# Patient Record
Sex: MALE | Race: WHITE | NOT HISPANIC OR LATINO | URBAN - METROPOLITAN AREA
[De-identification: names, ages, dates, MRNs, and addresses within clinical notes are randomized per-mention and may not be internally consistent; named-entity substitution may affect disease eponyms.]

---

## 2018-07-24 ENCOUNTER — EMERGENCY (EMERGENCY)
Facility: HOSPITAL | Age: 43
LOS: 0 days | Discharge: HOME | End: 2018-07-24
Attending: EMERGENCY MEDICINE | Admitting: EMERGENCY MEDICINE

## 2018-07-24 VITALS
WEIGHT: 244.93 LBS | DIASTOLIC BLOOD PRESSURE: 84 MMHG | SYSTOLIC BLOOD PRESSURE: 116 MMHG | OXYGEN SATURATION: 98 % | HEART RATE: 108 BPM | TEMPERATURE: 99 F | HEIGHT: 73 IN | RESPIRATION RATE: 16 BRPM

## 2018-07-24 VITALS
SYSTOLIC BLOOD PRESSURE: 118 MMHG | RESPIRATION RATE: 18 BRPM | OXYGEN SATURATION: 100 % | TEMPERATURE: 98 F | DIASTOLIC BLOOD PRESSURE: 70 MMHG | HEART RATE: 90 BPM

## 2018-07-24 DIAGNOSIS — K51.00 ULCERATIVE (CHRONIC) PANCOLITIS WITHOUT COMPLICATIONS: ICD-10-CM

## 2018-07-24 DIAGNOSIS — Z87.39 PERSONAL HISTORY OF OTHER DISEASES OF THE MUSCULOSKELETAL SYSTEM AND CONNECTIVE TISSUE: ICD-10-CM

## 2018-07-24 DIAGNOSIS — Z98.890 OTHER SPECIFIED POSTPROCEDURAL STATES: ICD-10-CM

## 2018-07-24 DIAGNOSIS — Z79.899 OTHER LONG TERM (CURRENT) DRUG THERAPY: ICD-10-CM

## 2018-07-24 DIAGNOSIS — Z79.2 LONG TERM (CURRENT) USE OF ANTIBIOTICS: ICD-10-CM

## 2018-07-24 DIAGNOSIS — Z98.890 OTHER SPECIFIED POSTPROCEDURAL STATES: Chronic | ICD-10-CM

## 2018-07-24 DIAGNOSIS — K92.1 MELENA: ICD-10-CM

## 2018-07-24 LAB
ALBUMIN SERPL ELPH-MCNC: 4 G/DL — SIGNIFICANT CHANGE UP (ref 3.5–5.2)
ALP SERPL-CCNC: 64 U/L — SIGNIFICANT CHANGE UP (ref 30–115)
ALT FLD-CCNC: 14 U/L — SIGNIFICANT CHANGE UP (ref 0–41)
ANION GAP SERPL CALC-SCNC: 13 MMOL/L — SIGNIFICANT CHANGE UP (ref 7–14)
AST SERPL-CCNC: 16 U/L — SIGNIFICANT CHANGE UP (ref 0–41)
BASOPHILS # BLD AUTO: 0.05 K/UL — SIGNIFICANT CHANGE UP (ref 0–0.2)
BASOPHILS NFR BLD AUTO: 0.5 % — SIGNIFICANT CHANGE UP (ref 0–1)
BILIRUB SERPL-MCNC: 0.9 MG/DL — SIGNIFICANT CHANGE UP (ref 0.2–1.2)
BUN SERPL-MCNC: 5 MG/DL — LOW (ref 10–20)
CALCIUM SERPL-MCNC: 9 MG/DL — SIGNIFICANT CHANGE UP (ref 8.5–10.1)
CHLORIDE SERPL-SCNC: 100 MMOL/L — SIGNIFICANT CHANGE UP (ref 98–110)
CO2 SERPL-SCNC: 28 MMOL/L — SIGNIFICANT CHANGE UP (ref 17–32)
CREAT SERPL-MCNC: 1.1 MG/DL — SIGNIFICANT CHANGE UP (ref 0.7–1.5)
EOSINOPHIL # BLD AUTO: 0.17 K/UL — SIGNIFICANT CHANGE UP (ref 0–0.7)
EOSINOPHIL NFR BLD AUTO: 1.6 % — SIGNIFICANT CHANGE UP (ref 0–8)
GLUCOSE SERPL-MCNC: 101 MG/DL — HIGH (ref 70–99)
HCT VFR BLD CALC: 42.9 % — SIGNIFICANT CHANGE UP (ref 42–52)
HGB BLD-MCNC: 14.5 G/DL — SIGNIFICANT CHANGE UP (ref 14–18)
IMM GRANULOCYTES NFR BLD AUTO: 0.7 % — HIGH (ref 0.1–0.3)
LIDOCAIN IGE QN: 255 U/L — HIGH (ref 7–60)
LYMPHOCYTES # BLD AUTO: 1.56 K/UL — SIGNIFICANT CHANGE UP (ref 1.2–3.4)
LYMPHOCYTES # BLD AUTO: 14.7 % — LOW (ref 20.5–51.1)
MAGNESIUM SERPL-MCNC: 2.1 MG/DL — SIGNIFICANT CHANGE UP (ref 1.8–2.4)
MCHC RBC-ENTMCNC: 28.3 PG — SIGNIFICANT CHANGE UP (ref 27–31)
MCHC RBC-ENTMCNC: 33.8 G/DL — SIGNIFICANT CHANGE UP (ref 32–37)
MCV RBC AUTO: 83.6 FL — SIGNIFICANT CHANGE UP (ref 80–94)
MONOCYTES # BLD AUTO: 1.25 K/UL — HIGH (ref 0.1–0.6)
MONOCYTES NFR BLD AUTO: 11.8 % — HIGH (ref 1.7–9.3)
NEUTROPHILS # BLD AUTO: 7.51 K/UL — HIGH (ref 1.4–6.5)
NEUTROPHILS NFR BLD AUTO: 70.7 % — SIGNIFICANT CHANGE UP (ref 42.2–75.2)
PLATELET # BLD AUTO: 207 K/UL — SIGNIFICANT CHANGE UP (ref 130–400)
POTASSIUM SERPL-MCNC: 4 MMOL/L — SIGNIFICANT CHANGE UP (ref 3.5–5)
POTASSIUM SERPL-SCNC: 4 MMOL/L — SIGNIFICANT CHANGE UP (ref 3.5–5)
PROT SERPL-MCNC: 6.1 G/DL — SIGNIFICANT CHANGE UP (ref 6–8)
RBC # BLD: 5.13 M/UL — SIGNIFICANT CHANGE UP (ref 4.7–6.1)
RBC # FLD: 14.6 % — HIGH (ref 11.5–14.5)
SODIUM SERPL-SCNC: 141 MMOL/L — SIGNIFICANT CHANGE UP (ref 135–146)
WBC # BLD: 10.61 K/UL — SIGNIFICANT CHANGE UP (ref 4.8–10.8)
WBC # FLD AUTO: 10.61 K/UL — SIGNIFICANT CHANGE UP (ref 4.8–10.8)

## 2018-07-24 RX ORDER — METRONIDAZOLE 500 MG
1 TABLET ORAL
Qty: 42 | Refills: 0 | OUTPATIENT
Start: 2018-07-24 | End: 2018-08-06

## 2018-07-24 RX ORDER — SODIUM CHLORIDE 9 MG/ML
1000 INJECTION INTRAMUSCULAR; INTRAVENOUS; SUBCUTANEOUS ONCE
Qty: 0 | Refills: 0 | Status: COMPLETED | OUTPATIENT
Start: 2018-07-24 | End: 2018-07-24

## 2018-07-24 RX ORDER — MOXIFLOXACIN HYDROCHLORIDE TABLETS, 400 MG 400 MG/1
1 TABLET, FILM COATED ORAL
Qty: 28 | Refills: 0 | OUTPATIENT
Start: 2018-07-24 | End: 2018-08-06

## 2018-07-24 RX ADMIN — SODIUM CHLORIDE 1000 MILLILITER(S): 9 INJECTION INTRAMUSCULAR; INTRAVENOUS; SUBCUTANEOUS at 12:40

## 2018-07-24 RX ADMIN — SODIUM CHLORIDE 1000 MILLILITER(S): 9 INJECTION INTRAMUSCULAR; INTRAVENOUS; SUBCUTANEOUS at 13:34

## 2018-07-24 NOTE — ED PROVIDER NOTE - PHYSICAL EXAMINATION
CONSTITUTIONAL: Well-developed; well-nourished; in no acute distress.   SKIN: warm, dry  CARD: S1, S2 normal; no murmurs, gallops, or rubs. Regular rate and rhythm.   RESP: No wheezes, rales or rhonchi.  ABD: soft ntnd; no CVA tenderness  : no external anal fissures or thrombosed hemorrhoids visualized; no internal hemorrhoids visualized, brown stool on examination, guiaic positive   EXT: Normal ROM.  No clubbing, cyanosis or edema.   LYMPH: No acute cervical adenopathy.  NEURO: Alert, oriented, grossly unremarkable  PSYCH: Cooperative, appropriate.

## 2018-07-24 NOTE — ED PROVIDER NOTE - NS ED ROS FT
Constitutional: See HPI.  Cardiac: No chest pain, SOB or edema. No chest pain with exertion.  Respiratory: No cough or respiratory distress.   GI: +diarrhea; No nausea, vomiting, or abdominal pain.  : No dysuria, frequency or burning.  MS: No myalgia, muscle weakness, joint pain or back pain.  Neuro: No headache or weakness. No LOC.  Skin: No skin rash.

## 2018-07-24 NOTE — ED PROVIDER NOTE - OBJECTIVE STATEMENT
44 y/o male with no pmhx presents with blood diarrhea for 18 days. Patient states he has multiple episodes of bright red bloody diarrhea every day, associated with abdominal cramping right before the bowel movement, relieved after defecation. Patient presented to ER today due to temporal temperature of 99.8-100F at home. Patient has seen Dr. Leon as outpatient for these symptoms, has been scheduled for colonoscopy and endoscopy on Aug 8th and has sent stool cultures to check for salmonalle, shigella, c.diff, etc. Patient states prior to the start of the diarrhea, he was constipated; denies laxative use, recent antibiotic use, travel/hiking.

## 2018-07-24 NOTE — ED PROVIDER NOTE - PROGRESS NOTE DETAILS
Spoke to Dr. Leon who stated to perform CT abdomen/pelvis with IV contrast, if negative, can discharge home and follow up for scheduled colonoscopy on Aug 8th. Dr. Leon aware of CT scan results; advised to prescribe cipro/flagyl, she will follow up with stool cultures as outpatient and advised patient to see her as outpatient on Monday July 30th. Will advise patient to stick to clear liquid diet until follow up with Dr. Leon

## 2018-07-24 NOTE — ED PROVIDER NOTE - ATTENDING CONTRIBUTION TO CARE
I personally evaluated the patient. I reviewed the Resident’s or Physician Assistant’s note (as assigned above), and agree with the findings and plan except as documented in my note.  Healthy man with watery bloody diarrhea as above for over 2 weeks, +nausea but is taking liquids, +occasional crampy abd pain, T100, no recent travel, has been on doxy for last year for rosacea, seen by Dr Leon, gave stool specimens yesterday and is scheduled for colonoscopy, on exam vital signs appreciated, well appearing, conj pink mmm neck supple cor rrr lungs cta abd +bs, soft with mild right sided ttp no g/r, rectal as above, labs and studies reviewed and d/w patient and Dr Leon, will treat with cipro and flagyl, stool studies inc cdif pending, as pt otherwise well appearing and po tolerant with soft abdomen will d/c to f/u with her. Patient counseled regarding conditions which should prompt return.

## 2018-07-24 NOTE — ED PROVIDER NOTE - MEDICAL DECISION MAKING DETAILS
Healthy man with watery bloody diarrhea as above for over 2 weeks, +nausea but is taking liquids, +occasional crampy abd pain, T100, no recent travel, has been on doxy for last year for rosacea, seen by Dr Leon, gave stool specimens yesterday and is scheduled for colonoscopy, on exam vital signs appreciated, well appearing, conj pink mmm neck supple cor rrr lungs cta abd +bs, soft with mild right sided ttp no g/r, rectal as above, labs and studies reviewed and d/w patient and Dr Leon, will treat with cipro and flagyl, stool studies inc cdif pending, as pt otherwise well appearing and po tolerant with soft abdomen will d/c to f/u with her. Patient counseled regarding conditions which should prompt return.

## 2018-07-24 NOTE — ED PROVIDER NOTE - CARE PLAN
Patient would like to get a rx for (2) handicap plaques for 5 years      Please advise Principal Discharge DX:	Pancolitis

## 2018-07-29 ENCOUNTER — INPATIENT (INPATIENT)
Facility: HOSPITAL | Age: 43
LOS: 2 days | Discharge: HOME | End: 2018-08-01
Attending: INTERNAL MEDICINE | Admitting: INTERNAL MEDICINE

## 2018-07-29 VITALS
DIASTOLIC BLOOD PRESSURE: 82 MMHG | HEIGHT: 73 IN | WEIGHT: 240.08 LBS | TEMPERATURE: 99 F | OXYGEN SATURATION: 96 % | HEART RATE: 116 BPM | SYSTOLIC BLOOD PRESSURE: 112 MMHG | RESPIRATION RATE: 20 BRPM

## 2018-07-29 DIAGNOSIS — K51.00 ULCERATIVE (CHRONIC) PANCOLITIS WITHOUT COMPLICATIONS: ICD-10-CM

## 2018-07-29 DIAGNOSIS — Z98.890 OTHER SPECIFIED POSTPROCEDURAL STATES: Chronic | ICD-10-CM
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
## 2018-07-29 LAB
ALBUMIN SERPL ELPH-MCNC: 3 G/DL — LOW (ref 3.5–5.2)
ALP SERPL-CCNC: 42 U/L — SIGNIFICANT CHANGE UP (ref 30–115)
ALT FLD-CCNC: 8 U/L — SIGNIFICANT CHANGE UP (ref 0–41)
ANION GAP SERPL CALC-SCNC: 12 MMOL/L — SIGNIFICANT CHANGE UP (ref 7–14)
APPEARANCE UR: CLEAR — SIGNIFICANT CHANGE UP
AST SERPL-CCNC: 18 U/L — SIGNIFICANT CHANGE UP (ref 0–41)
BACTERIA # UR AUTO: ABNORMAL
BASOPHILS # BLD AUTO: 0.06 K/UL — SIGNIFICANT CHANGE UP (ref 0–0.2)
BASOPHILS NFR BLD AUTO: 0.5 % — SIGNIFICANT CHANGE UP (ref 0–1)
BILIRUB SERPL-MCNC: 0.5 MG/DL — SIGNIFICANT CHANGE UP (ref 0.2–1.2)
BILIRUB UR-MCNC: ABNORMAL
BUN SERPL-MCNC: 7 MG/DL — LOW (ref 10–20)
C DIFF BY PCR RESULT: NEGATIVE — SIGNIFICANT CHANGE UP
C DIFF TOX GENS STL QL NAA+PROBE: SIGNIFICANT CHANGE UP
CALCIUM SERPL-MCNC: 8.1 MG/DL — LOW (ref 8.5–10.1)
CHLORIDE SERPL-SCNC: 99 MMOL/L — SIGNIFICANT CHANGE UP (ref 98–110)
CO2 SERPL-SCNC: 24 MMOL/L — SIGNIFICANT CHANGE UP (ref 17–32)
COD CRY URNS QL: NEGATIVE — SIGNIFICANT CHANGE UP
COLOR SPEC: SIGNIFICANT CHANGE UP
COMMENT - URINE: SIGNIFICANT CHANGE UP
CREAT SERPL-MCNC: 0.8 MG/DL — SIGNIFICANT CHANGE UP (ref 0.7–1.5)
DIFF PNL FLD: NEGATIVE — SIGNIFICANT CHANGE UP
EOSINOPHIL # BLD AUTO: 0.39 K/UL — SIGNIFICANT CHANGE UP (ref 0–0.7)
EOSINOPHIL NFR BLD AUTO: 3.5 % — SIGNIFICANT CHANGE UP (ref 0–8)
EPI CELLS # UR: ABNORMAL /HPF
GLUCOSE SERPL-MCNC: 132 MG/DL — HIGH (ref 70–99)
GLUCOSE UR QL: NEGATIVE MG/DL — SIGNIFICANT CHANGE UP
GRAN CASTS # UR COMP ASSIST: NEGATIVE — SIGNIFICANT CHANGE UP
HCT VFR BLD CALC: 37.2 % — LOW (ref 42–52)
HGB BLD-MCNC: 13.1 G/DL — LOW (ref 14–18)
HYALINE CASTS # UR AUTO: NEGATIVE — SIGNIFICANT CHANGE UP
IMM GRANULOCYTES NFR BLD AUTO: 0.6 % — HIGH (ref 0.1–0.3)
KETONES UR-MCNC: ABNORMAL
LACTATE SERPL-SCNC: 1.2 MMOL/L — SIGNIFICANT CHANGE UP (ref 0.5–2.2)
LEUKOCYTE ESTERASE UR-ACNC: ABNORMAL
LIDOCAIN IGE QN: 255 U/L — HIGH (ref 7–60)
LYMPHOCYTES # BLD AUTO: 1.48 K/UL — SIGNIFICANT CHANGE UP (ref 1.2–3.4)
LYMPHOCYTES # BLD AUTO: 13.2 % — LOW (ref 20.5–51.1)
MAGNESIUM SERPL-MCNC: 2.1 MG/DL — SIGNIFICANT CHANGE UP (ref 1.8–2.4)
MCHC RBC-ENTMCNC: 28.2 PG — SIGNIFICANT CHANGE UP (ref 27–31)
MCHC RBC-ENTMCNC: 35.2 G/DL — SIGNIFICANT CHANGE UP (ref 32–37)
MCV RBC AUTO: 80.2 FL — SIGNIFICANT CHANGE UP (ref 80–94)
MONOCYTES # BLD AUTO: 1.04 K/UL — HIGH (ref 0.1–0.6)
MONOCYTES NFR BLD AUTO: 9.3 % — SIGNIFICANT CHANGE UP (ref 1.7–9.3)
NEUTROPHILS # BLD AUTO: 8.15 K/UL — HIGH (ref 1.4–6.5)
NEUTROPHILS NFR BLD AUTO: 72.9 % — SIGNIFICANT CHANGE UP (ref 42.2–75.2)
NITRITE UR-MCNC: POSITIVE
NRBC # BLD: 0 /100 WBCS — SIGNIFICANT CHANGE UP (ref 0–0)
PH UR: 6 — SIGNIFICANT CHANGE UP (ref 5–8)
PLATELET # BLD AUTO: 277 K/UL — SIGNIFICANT CHANGE UP (ref 130–400)
POTASSIUM SERPL-MCNC: 4.1 MMOL/L — SIGNIFICANT CHANGE UP (ref 3.5–5)
POTASSIUM SERPL-SCNC: 4.1 MMOL/L — SIGNIFICANT CHANGE UP (ref 3.5–5)
PROT SERPL-MCNC: 5.2 G/DL — LOW (ref 6–8)
PROT UR-MCNC: 30 MG/DL
RBC # BLD: 4.64 M/UL — LOW (ref 4.7–6.1)
RBC # FLD: 13.8 % — SIGNIFICANT CHANGE UP (ref 11.5–14.5)
RBC CASTS # UR COMP ASSIST: SIGNIFICANT CHANGE UP /HPF
SODIUM SERPL-SCNC: 135 MMOL/L — SIGNIFICANT CHANGE UP (ref 135–146)
SP GR SPEC: >=1.03 (ref 1.01–1.03)
TRI-PHOS CRY UR QL COMP ASSIST: NEGATIVE — SIGNIFICANT CHANGE UP
URATE CRY FLD QL MICRO: NEGATIVE — SIGNIFICANT CHANGE UP
UROBILINOGEN FLD QL: 0.2 MG/DL — SIGNIFICANT CHANGE UP (ref 0.2–0.2)
WBC # BLD: 11.19 K/UL — HIGH (ref 4.8–10.8)
WBC # FLD AUTO: 11.19 K/UL — HIGH (ref 4.8–10.8)
WBC UR QL: SIGNIFICANT CHANGE UP /HPF

## 2018-07-29 RX ORDER — CIPROFLOXACIN LACTATE 400MG/40ML
400 VIAL (ML) INTRAVENOUS ONCE
Qty: 0 | Refills: 0 | Status: COMPLETED | OUTPATIENT
Start: 2018-07-29 | End: 2018-07-29

## 2018-07-29 RX ORDER — SODIUM CHLORIDE 9 MG/ML
1000 INJECTION INTRAMUSCULAR; INTRAVENOUS; SUBCUTANEOUS
Qty: 0 | Refills: 0 | Status: DISCONTINUED | OUTPATIENT
Start: 2018-07-29 | End: 2018-08-01

## 2018-07-29 RX ORDER — ONDANSETRON 8 MG/1
4 TABLET, FILM COATED ORAL ONCE
Qty: 0 | Refills: 0 | Status: COMPLETED | OUTPATIENT
Start: 2018-07-29 | End: 2018-07-29

## 2018-07-29 RX ORDER — CELECOXIB 200 MG/1
1 CAPSULE ORAL
Qty: 0 | Refills: 0 | COMMUNITY

## 2018-07-29 RX ORDER — METRONIDAZOLE 500 MG
500 TABLET ORAL ONCE
Qty: 0 | Refills: 0 | Status: COMPLETED | OUTPATIENT
Start: 2018-07-29 | End: 2018-07-29

## 2018-07-29 RX ORDER — PANTOPRAZOLE SODIUM 20 MG/1
40 TABLET, DELAYED RELEASE ORAL DAILY
Qty: 0 | Refills: 0 | Status: DISCONTINUED | OUTPATIENT
Start: 2018-07-29 | End: 2018-07-30

## 2018-07-29 RX ORDER — METRONIDAZOLE 500 MG
500 TABLET ORAL EVERY 8 HOURS
Qty: 0 | Refills: 0 | Status: DISCONTINUED | OUTPATIENT
Start: 2018-07-29 | End: 2018-08-01

## 2018-07-29 RX ORDER — MORPHINE SULFATE 50 MG/1
4 CAPSULE, EXTENDED RELEASE ORAL EVERY 4 HOURS
Qty: 0 | Refills: 0 | Status: DISCONTINUED | OUTPATIENT
Start: 2018-07-29 | End: 2018-08-01

## 2018-07-29 RX ORDER — ONDANSETRON 8 MG/1
4 TABLET, FILM COATED ORAL EVERY 4 HOURS
Qty: 0 | Refills: 0 | Status: DISCONTINUED | OUTPATIENT
Start: 2018-07-29 | End: 2018-08-01

## 2018-07-29 RX ORDER — CIPROFLOXACIN LACTATE 400MG/40ML
400 VIAL (ML) INTRAVENOUS EVERY 12 HOURS
Qty: 0 | Refills: 0 | Status: DISCONTINUED | OUTPATIENT
Start: 2018-07-29 | End: 2018-07-31

## 2018-07-29 RX ORDER — SODIUM CHLORIDE 9 MG/ML
3 INJECTION INTRAMUSCULAR; INTRAVENOUS; SUBCUTANEOUS EVERY 8 HOURS
Qty: 0 | Refills: 0 | Status: DISCONTINUED | OUTPATIENT
Start: 2018-07-29 | End: 2018-08-01

## 2018-07-29 RX ADMIN — PANTOPRAZOLE SODIUM 40 MILLIGRAM(S): 20 TABLET, DELAYED RELEASE ORAL at 12:46

## 2018-07-29 RX ADMIN — Medication 100 MILLIGRAM(S): at 02:48

## 2018-07-29 RX ADMIN — MORPHINE SULFATE 4 MILLIGRAM(S): 50 CAPSULE, EXTENDED RELEASE ORAL at 10:44

## 2018-07-29 RX ADMIN — ONDANSETRON 4 MILLIGRAM(S): 8 TABLET, FILM COATED ORAL at 02:49

## 2018-07-29 RX ADMIN — Medication 100 MILLIGRAM(S): at 14:54

## 2018-07-29 RX ADMIN — Medication 200 MILLIGRAM(S): at 17:04

## 2018-07-29 RX ADMIN — SODIUM CHLORIDE 3 MILLILITER(S): 9 INJECTION INTRAMUSCULAR; INTRAVENOUS; SUBCUTANEOUS at 02:48

## 2018-07-29 RX ADMIN — SODIUM CHLORIDE 3 MILLILITER(S): 9 INJECTION INTRAMUSCULAR; INTRAVENOUS; SUBCUTANEOUS at 21:31

## 2018-07-29 RX ADMIN — SODIUM CHLORIDE 3 MILLILITER(S): 9 INJECTION INTRAMUSCULAR; INTRAVENOUS; SUBCUTANEOUS at 14:55

## 2018-07-29 RX ADMIN — Medication 100 MILLIGRAM(S): at 03:10

## 2018-07-29 RX ADMIN — Medication 100 MILLIGRAM(S): at 21:32

## 2018-07-29 RX ADMIN — MORPHINE SULFATE 4 MILLIGRAM(S): 50 CAPSULE, EXTENDED RELEASE ORAL at 10:12

## 2018-07-29 NOTE — H&P ADULT - ASSESSMENT
Patient is a 43y old  Male who presents with a chief complaint of Pancolitis (29 Jul 2018 06:17)                                                                                                                                                                                                                                                                                       HEALTH ISSUES - PROBLEM Dx:   pancolitis

## 2018-07-29 NOTE — ED ADULT TRIAGE NOTE - CHIEF COMPLAINT QUOTE
I have severe stomach pain.  I was here a few days ago and diagnosed with pancolitis and sent home with antibiotics.  My stomach is hurting worse.

## 2018-07-29 NOTE — H&P ADULT - NSHPLABSRESULTS_GEN_ALL_CORE
13.1   11.19 )-----------( 277      ( 29 Jul 2018 02:35 )             37.2     07-29    135  |  99  |  7<L>  ----------------------------<  132<H>  4.1   |  24  |  0.8    Ca    8.1<L>      29 Jul 2018 02:35  Mg     2.1     07-29    TPro  5.2<L>  /  Alb  3.0<L>  /  TBili  0.5  /  DBili  x   /  AST  18  /  ALT  8   /  AlkPhos  42  07-29          Urinalysis Basic - ( 29 Jul 2018 04:20 )    Color: Dixie / Appearance: Clear / SG: >=1.030 / pH: x  Gluc: x / Ketone: Trace  / Bili: Small / Urobili: 0.2 mg/dL   Blood: x / Protein: 30 mg/dL / Nitrite: Positive   Leuk Esterase: Trace / RBC: 1-2 /HPF / WBC 3-5 /HPF   Sq Epi: x / Non Sq Epi: Occasional /HPF / Bacteria: Moderate        Lactate Trend  07-29 @ 02:35 Lactate:1.2         CAPILLARY BLOOD GLUCOSE

## 2018-07-29 NOTE — ED PROVIDER NOTE - NS ED ROS FT
Constitutional: (+) chills (-) fever (-) malaise (-) diaphoresis   Eyes/ENT: (-) vision changes  Cardiovascular: (-) chest pain, (-) syncope (-) palpitations   Respiratory: (-) cough, (-) shortness of breath   Gastrointestinal: (+ )nausea (-) vomiting (+) blood tinged diarrhea (+) abdominal pain (+) abdominal cramping  : (-) dysuria (-) hematuria   Musculoskeletal: (-) neck pain, (-) back pain  Integumentary: (-) rash,   Neurological: (-) headache (-) dizziness (-) altered mental status (-) LOC

## 2018-07-29 NOTE — H&P ADULT - HISTORY OF PRESENT ILLNESS
high fever, hemetemsis dysuria  n/a pt had bloody diarrhea  during this period   No similarn  episodes  i  the past

## 2018-07-29 NOTE — ED PROVIDER NOTE - PROGRESS NOTE DETAILS
I personally evaluated the patient. I reviewed the Physician Assistant Fellow's note and agree with the findings and plan. pancolitis. failure of outpatient treatment. admit for further management. Patient to be admitted for failure of outpatient antibiotics.

## 2018-07-29 NOTE — ED PROVIDER NOTE - CARE PLAN
Principal Discharge DX:	Pancolitis Principal Discharge DX:	Pancolitis  Secondary Diagnosis:	Failure of outpatient treatment

## 2018-07-29 NOTE — ED PROVIDER NOTE - MEDICAL DECISION MAKING DETAILS
I personally evaluated the patient. I reviewed the Resident’s or Physician Assistant’s note (as assigned above), and agree with the findings and plan except as documented in my note.  Chart reviewed. H/O pancolitis, recently in ED, on Cipro and Flagyl, here for weakness, abdominal pain, nausea and bloody diarrhea. Seen by GI. Exam shows alert patient in no distress, HEENT NCAT, lungs clear, RR S1S2, abdomen soft NT +BS, no CCE. Labs unremarkable. CXR negative. Given IVF, IV Cipro and Flagyl. Will admit for failure of outpatient therapy.

## 2018-07-29 NOTE — ED PROVIDER NOTE - PHYSICAL EXAMINATION
GENERAL: Alert, NAD, well appearing  EYES: PERRLA, EOMI  ENT:  No pharyngeal erythema or exudates. Uvula midline. TMs with clear cone of light.   PULM: Chest rise symmetrical. Clear auscultation of breathe sounds B/L. No wheeze/stridor/retractions  CARDIO: Nml S1/S2.  No murmurs, rubs, or gallops appreciated.  + distal pulses intact  ABD: No visible scars, discolorations, striae, or pulsations.  Soft, non-tender, non-distended. Nml bowel sounds x 4. No CVAT  SKIN: No rash. Warm/Dry.   EXT: Radial and pedal pulses present B/L. No calf tenderness or swelling B/L.  NEURO: AAOx3, Motor Strength intact/equal.  Sensation intact/equal.

## 2018-07-29 NOTE — ED PROVIDER NOTE - OBJECTIVE STATEMENT
44 yo male with no significant PMH presents to the ED c/o nausea, abdominal pain, and bloody diarrhea since Tuesday. Patient was seen in the ED 42 yo male with no significant PMH presents to the ED c/o nausea, abdominal pain, and bloody diarrhea since Tuesday. Patient was seen in the ED, had CT, diagnosed with pancolitis, and discharged home with Flagyl and Cipro.  Patient states he's been on the medication since Tuesday and is still having the same symptoms and now has abdominal pain and bloating. Patient admits to subjective fever and chills. Patient denies any modifying factors.  Patient denies SOB, vomiting, chest pain, dysuria, hematuria, or dizziness. GI doctor is Dr. Leon.
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
## 2018-07-30 DIAGNOSIS — D64.9 ANEMIA, UNSPECIFIED: ICD-10-CM

## 2018-07-30 LAB
ANION GAP SERPL CALC-SCNC: 10 MMOL/L — SIGNIFICANT CHANGE UP (ref 7–14)
BUN SERPL-MCNC: 5 MG/DL — LOW (ref 10–20)
CALCIUM SERPL-MCNC: 7.5 MG/DL — LOW (ref 8.5–10.1)
CHLORIDE SERPL-SCNC: 104 MMOL/L — SIGNIFICANT CHANGE UP (ref 98–110)
CO2 SERPL-SCNC: 23 MMOL/L — SIGNIFICANT CHANGE UP (ref 17–32)
CREAT SERPL-MCNC: 0.8 MG/DL — SIGNIFICANT CHANGE UP (ref 0.7–1.5)
GLUCOSE SERPL-MCNC: 104 MG/DL — HIGH (ref 70–99)
HCT VFR BLD CALC: 33 % — LOW (ref 42–52)
HGB BLD-MCNC: 11.3 G/DL — LOW (ref 14–18)
MCHC RBC-ENTMCNC: 27.8 PG — SIGNIFICANT CHANGE UP (ref 27–31)
MCHC RBC-ENTMCNC: 34.2 G/DL — SIGNIFICANT CHANGE UP (ref 32–37)
MCV RBC AUTO: 81.1 FL — SIGNIFICANT CHANGE UP (ref 80–94)
NRBC # BLD: 0 /100 WBCS — SIGNIFICANT CHANGE UP (ref 0–0)
PLATELET # BLD AUTO: 255 K/UL — SIGNIFICANT CHANGE UP (ref 130–400)
POTASSIUM SERPL-MCNC: 4 MMOL/L — SIGNIFICANT CHANGE UP (ref 3.5–5)
POTASSIUM SERPL-SCNC: 4 MMOL/L — SIGNIFICANT CHANGE UP (ref 3.5–5)
RBC # BLD: 4.07 M/UL — LOW (ref 4.7–6.1)
RBC # FLD: 14.2 % — SIGNIFICANT CHANGE UP (ref 11.5–14.5)
SODIUM SERPL-SCNC: 137 MMOL/L — SIGNIFICANT CHANGE UP (ref 135–146)
WBC # BLD: 7.84 K/UL — SIGNIFICANT CHANGE UP (ref 4.8–10.8)
WBC # FLD AUTO: 7.84 K/UL — SIGNIFICANT CHANGE UP (ref 4.8–10.8)

## 2018-07-30 RX ORDER — ACETAMINOPHEN 500 MG
650 TABLET ORAL EVERY 6 HOURS
Qty: 0 | Refills: 0 | Status: DISCONTINUED | OUTPATIENT
Start: 2018-07-30 | End: 2018-08-01

## 2018-07-30 RX ORDER — PANTOPRAZOLE SODIUM 20 MG/1
40 TABLET, DELAYED RELEASE ORAL
Qty: 0 | Refills: 0 | Status: DISCONTINUED | OUTPATIENT
Start: 2018-07-31 | End: 2018-08-01

## 2018-07-30 RX ORDER — SIMETHICONE 80 MG/1
80 TABLET, CHEWABLE ORAL THREE TIMES A DAY
Qty: 0 | Refills: 0 | Status: DISCONTINUED | OUTPATIENT
Start: 2018-07-30 | End: 2018-08-01

## 2018-07-30 RX ADMIN — SODIUM CHLORIDE 3 MILLILITER(S): 9 INJECTION INTRAMUSCULAR; INTRAVENOUS; SUBCUTANEOUS at 14:06

## 2018-07-30 RX ADMIN — Medication 100 MILLIGRAM(S): at 21:19

## 2018-07-30 RX ADMIN — Medication 100 MILLIGRAM(S): at 05:18

## 2018-07-30 RX ADMIN — PANTOPRAZOLE SODIUM 40 MILLIGRAM(S): 20 TABLET, DELAYED RELEASE ORAL at 12:20

## 2018-07-30 RX ADMIN — Medication 200 MILLIGRAM(S): at 18:01

## 2018-07-30 RX ADMIN — Medication 100 MILLIGRAM(S): at 14:06

## 2018-07-30 RX ADMIN — SODIUM CHLORIDE 3 MILLILITER(S): 9 INJECTION INTRAMUSCULAR; INTRAVENOUS; SUBCUTANEOUS at 21:20

## 2018-07-30 RX ADMIN — Medication 200 MILLIGRAM(S): at 05:18

## 2018-07-30 RX ADMIN — SODIUM CHLORIDE 3 MILLILITER(S): 9 INJECTION INTRAMUSCULAR; INTRAVENOUS; SUBCUTANEOUS at 05:16

## 2018-07-30 RX ADMIN — SODIUM CHLORIDE 125 MILLILITER(S): 9 INJECTION INTRAMUSCULAR; INTRAVENOUS; SUBCUTANEOUS at 18:01

## 2018-07-31 ENCOUNTER — TRANSCRIPTION ENCOUNTER (OUTPATIENT)
Age: 43
End: 2018-07-31

## 2018-07-31 ENCOUNTER — RESULT REVIEW (OUTPATIENT)
Age: 43
End: 2018-07-31

## 2018-07-31 LAB
ALLERGY+IMMUNOLOGY DIAG STUDY NOTE: SIGNIFICANT CHANGE UP
ANION GAP SERPL CALC-SCNC: 11 MMOL/L — SIGNIFICANT CHANGE UP (ref 7–14)
BUN SERPL-MCNC: 6 MG/DL — LOW (ref 10–20)
CALCIUM SERPL-MCNC: 7.5 MG/DL — LOW (ref 8.5–10.1)
CHLORIDE SERPL-SCNC: 106 MMOL/L — SIGNIFICANT CHANGE UP (ref 98–110)
CO2 SERPL-SCNC: 26 MMOL/L — SIGNIFICANT CHANGE UP (ref 17–32)
CREAT SERPL-MCNC: 0.8 MG/DL — SIGNIFICANT CHANGE UP (ref 0.7–1.5)
CULTURE RESULTS: SIGNIFICANT CHANGE UP
CULTURE RESULTS: SIGNIFICANT CHANGE UP
GLUCOSE SERPL-MCNC: 129 MG/DL — HIGH (ref 70–99)
HCT VFR BLD CALC: 31.4 % — LOW (ref 42–52)
HGB BLD-MCNC: 10.9 G/DL — LOW (ref 14–18)
MCHC RBC-ENTMCNC: 28.6 PG — SIGNIFICANT CHANGE UP (ref 27–31)
MCHC RBC-ENTMCNC: 34.7 G/DL — SIGNIFICANT CHANGE UP (ref 32–37)
MCV RBC AUTO: 82.4 FL — SIGNIFICANT CHANGE UP (ref 80–94)
NRBC # BLD: 0 /100 WBCS — SIGNIFICANT CHANGE UP (ref 0–0)
PLATELET # BLD AUTO: 274 K/UL — SIGNIFICANT CHANGE UP (ref 130–400)
POTASSIUM SERPL-MCNC: 4.1 MMOL/L — SIGNIFICANT CHANGE UP (ref 3.5–5)
POTASSIUM SERPL-SCNC: 4.1 MMOL/L — SIGNIFICANT CHANGE UP (ref 3.5–5)
RBC # BLD: 3.81 M/UL — LOW (ref 4.7–6.1)
RBC # FLD: 14.3 % — SIGNIFICANT CHANGE UP (ref 11.5–14.5)
SODIUM SERPL-SCNC: 143 MMOL/L — SIGNIFICANT CHANGE UP (ref 135–146)
SPECIMEN SOURCE: SIGNIFICANT CHANGE UP
SPECIMEN SOURCE: SIGNIFICANT CHANGE UP
TYPE + AB SCN PNL BLD: SIGNIFICANT CHANGE UP
WBC # BLD: 8.74 K/UL — SIGNIFICANT CHANGE UP (ref 4.8–10.8)
WBC # FLD AUTO: 8.74 K/UL — SIGNIFICANT CHANGE UP (ref 4.8–10.8)

## 2018-07-31 RX ORDER — PANTOPRAZOLE SODIUM 20 MG/1
1 TABLET, DELAYED RELEASE ORAL
Qty: 30 | Refills: 0 | OUTPATIENT
Start: 2018-07-31 | End: 2018-08-29

## 2018-07-31 RX ORDER — CEFUROXIME AXETIL 250 MG
1 TABLET ORAL
Qty: 24 | Refills: 0 | OUTPATIENT
Start: 2018-07-31 | End: 2018-08-11

## 2018-07-31 RX ORDER — CEFUROXIME AXETIL 250 MG
500 TABLET ORAL EVERY 12 HOURS
Qty: 0 | Refills: 0 | Status: DISCONTINUED | OUTPATIENT
Start: 2018-07-31 | End: 2018-08-01

## 2018-07-31 RX ORDER — METRONIDAZOLE 500 MG
1 TABLET ORAL
Qty: 36 | Refills: 0 | OUTPATIENT
Start: 2018-07-31 | End: 2018-08-11

## 2018-07-31 RX ORDER — MESALAMINE 400 MG
1600 TABLET, DELAYED RELEASE (ENTERIC COATED) ORAL THREE TIMES A DAY
Qty: 0 | Refills: 0 | Status: DISCONTINUED | OUTPATIENT
Start: 2018-07-31 | End: 2018-08-01

## 2018-07-31 RX ADMIN — Medication 500 MILLIGRAM(S): at 18:31

## 2018-07-31 RX ADMIN — SODIUM CHLORIDE 3 MILLILITER(S): 9 INJECTION INTRAMUSCULAR; INTRAVENOUS; SUBCUTANEOUS at 21:11

## 2018-07-31 RX ADMIN — Medication 100 MILLIGRAM(S): at 14:17

## 2018-07-31 RX ADMIN — Medication 500 MILLIGRAM(S): at 14:17

## 2018-07-31 RX ADMIN — Medication 200 MILLIGRAM(S): at 06:32

## 2018-07-31 RX ADMIN — Medication 20 MILLIGRAM(S): at 21:10

## 2018-07-31 RX ADMIN — Medication 100 MILLIGRAM(S): at 05:21

## 2018-07-31 RX ADMIN — Medication 100 MILLIGRAM(S): at 21:11

## 2018-07-31 RX ADMIN — SODIUM CHLORIDE 3 MILLILITER(S): 9 INJECTION INTRAMUSCULAR; INTRAVENOUS; SUBCUTANEOUS at 13:30

## 2018-07-31 RX ADMIN — SODIUM CHLORIDE 3 MILLILITER(S): 9 INJECTION INTRAMUSCULAR; INTRAVENOUS; SUBCUTANEOUS at 05:21

## 2018-07-31 RX ADMIN — SODIUM CHLORIDE 125 MILLILITER(S): 9 INJECTION INTRAMUSCULAR; INTRAVENOUS; SUBCUTANEOUS at 06:32

## 2018-07-31 RX ADMIN — SODIUM CHLORIDE 125 MILLILITER(S): 9 INJECTION INTRAMUSCULAR; INTRAVENOUS; SUBCUTANEOUS at 14:00

## 2018-07-31 RX ADMIN — PANTOPRAZOLE SODIUM 40 MILLIGRAM(S): 20 TABLET, DELAYED RELEASE ORAL at 06:32

## 2018-07-31 RX ADMIN — SIMETHICONE 80 MILLIGRAM(S): 80 TABLET, CHEWABLE ORAL at 06:32

## 2018-07-31 RX ADMIN — Medication 1600 MILLIGRAM(S): at 21:11

## 2018-07-31 NOTE — PACU DISCHARGE NOTE - THE ANESTHESIA ORDERS USED IN THE PACU ORDER SET WILL BE DISCONTINUED UPON TRANSFER OF THIS PATIENT
Weight loss strongly encouraged. Is to start rehab once acute issue has resolved. Nutrition consult as outpatient.      Statement Selected

## 2018-07-31 NOTE — PACU DISCHARGE NOTE - COMMENTS
vitals as per anesthesia record   perioperative course uneventful  no obvious anesthesia related issues

## 2018-07-31 NOTE — DISCHARGE NOTE ADULT - MEDICATION SUMMARY - MEDICATIONS TO TAKE
I will START or STAY ON the medications listed below when I get home from the hospital:    mesalamine 800 mg oral delayed release tablet  -- 2 tab(s) by mouth 3 times a day   -- Do not chew, break, or crush.  Swallow whole.  Do not crush.    -- Indication: For Ulcerative colitis    predniSONE 20 mg oral tablet  -- 3 tab(s) by mouth once a day   -- It is very important that you take or use this exactly as directed.  Do not skip doses or discontinue unless directed by your doctor.  Obtain medical advice before taking any non-prescription drugs as some may affect the action of this medication.  Take with food or milk.    -- Indication: For Ulcerative colitis    pantoprazole 40 mg oral delayed release tablet  -- 1 tab(s) by mouth once a day (before a meal)  -- Indication: For for stomach

## 2018-07-31 NOTE — DISCHARGE NOTE ADULT - CARE PROVIDER_API CALL
Edyta Leon (MD), Internal Medicine  [address] [place] [address]  Phone: [phone]  Fax: [phone]

## 2018-07-31 NOTE — DISCHARGE NOTE ADULT - HOSPITAL COURSE
***patient seen and examined at bedside. Spent over 40mins d/c planning, d/c papers and med rec ***    Vital Signs Last 24 Hrs  T(C): 36.5 (31 Jul 2018 06:04), Max: 37.3 (30 Jul 2018 22:12)  T(F): 97.7 (31 Jul 2018 06:04), Max: 99.2 (30 Jul 2018 22:12)  HR: 93 (31 Jul 2018 06:04) (93 - 94)  BP: 101/59 (31 Jul 2018 06:04) (101/59 - 111/68)  RR: 16 (31 Jul 2018 06:04) (16 - 16)      Assessment and Plan:   · Assessment	  Patient is a 43y old  Male who presents with a chief complaint of bloody bowel movement diagnosed with Pancolitis (29 Jul 2018 06:17) pt seen by GI inpatient, needs outpatient colonoscopy to rule out if any underlying inflammatory bowel disease/UC  no steroids for now as per GI, will continue abx  (d/c cipro due to minor side effects; add cephalosporins and continue with flagyl); probiotics over the counter     Problem/Plan - 1:  ·  Problem: Pancolitis.  Plan: -on IV antibiotics  -tolerated diet  -GI follow up with Dr. Leon/GI     Problem/Plan - 2:  ·  Problem: Anemia.  Plan: -no need for inpatient blood transfusion  -formed stools    prelim stool sample negative for pathogens, c-diff  Spoke to wife in length at bedside    may d/c home today ***patient seen and examined at bedside. Spent over 40mins d/c planning, d/c papers and med rec ***    Vital Signs Last 24 Hrs  T(C): 36.5 (31 Jul 2018 06:04), Max: 37.3 (30 Jul 2018 22:12)  T(F): 97.7 (31 Jul 2018 06:04), Max: 99.2 (30 Jul 2018 22:12)  HR: 93 (31 Jul 2018 06:04) (93 - 94)  BP: 101/59 (31 Jul 2018 06:04) (101/59 - 111/68)  RR: 16 (31 Jul 2018 06:04) (16 - 16)      Assessment and Plan:   · Assessment	  Patient is a 43y old  Male who presents with a chief complaint of bloody bowel movement diagnosed with Pancolitis (29 Jul 2018 06:17) pt seen by GI inpatient, needs outpatient colonoscopy to rule out if any underlying inflammatory bowel disease/UC  no steroids for now as per GI, will continue abx  (d/c cipro due to minor side effects; add cephalosporins and continue with flagyl); probiotics over the counter     Problem/Plan - 1:  ·  Problem: Pancolitis.  Plan: -on IV antibiotics inpatient (not needed, will d/c abx)  -tolerated diet  -GI follow up with Dr. Leon/GI on the outside in one month     Problem/Plan - 2:  ·  Problem: Anemia.  Plan: -no need for inpatient blood transfusion  -formed stools    prelim stool sample negative for pathogens, c-diff  Spoke to wife in length at bedside    may d/c home today as the patient wants to be discharged and does not want to stay for IV steroids.    escripts sent to his pharmacy  prednisone 60mg once daily for 2 weeks and then 40mg once daily thereafter until seen by GI. antibiotics discontinued

## 2018-07-31 NOTE — PRE-ANESTHESIA EVALUATION ADULT - NSANTHOSAYNRD_GEN_A_CORE
No. KILEY screening performed.  STOP BANG Legend: 0-2 = LOW Risk; 3-4 = INTERMEDIATE Risk; 5-8 = HIGH Risk

## 2018-07-31 NOTE — DISCHARGE NOTE ADULT - CARE PLAN
Principal Discharge DX:	Pancolitis  Goal:	to be symptom free  Assessment and plan of treatment:	-finish antibiotics course  -outpatient follow up with Dr. Leon/GI Principal Discharge DX:	Pancolitis  Goal:	likely Ulcerative colitis  Assessment and plan of treatment:	-take mesalamine 800mg 2 tabs three times a day + prednisone 60mg once daily for 2 weeks and then decrease to prednisone 40mg once daily until seen by GI  -outpatient follow up with Dr. Leon/GI

## 2018-07-31 NOTE — DISCHARGE NOTE ADULT - MEDICATION SUMMARY - MEDICATIONS TO STOP TAKING
I will STOP taking the medications listed below when I get home from the hospital:    CeleBREX 200 mg oral capsule  -- 1 cap(s) by mouth 2 times a day    dicyclomine 10 mg oral capsule    doxycycline hyclate 100 mg oral capsule  -- 1 cap(s) by mouth 2 times a day    Cipro 500 mg oral tablet  -- 1 tab(s) by mouth 2 times a day   -- Avoid prolonged or excessive exposure to direct and/or artificial sunlight while taking this medication.  Check with your doctor before becoming pregnant.  Do not take dairy products, antacids, or iron preparations within one hour of this medication.  Finish all this medication unless otherwise directed by prescriber.  Medication should be taken with plenty of water.    Flagyl 500 mg oral tablet  -- 1 tab(s) by mouth 3 times a day   -- Do not drink alcoholic beverages when taking this medication.  Finish all this medication unless otherwise directed by prescriber.  May discolor urine or feces.

## 2018-07-31 NOTE — DISCHARGE NOTE ADULT - PLAN OF CARE
to be symptom free -finish antibiotics course  -outpatient follow up with Dr. Leon/GI likely Ulcerative colitis -take mesalamine 800mg 2 tabs three times a day + prednisone 60mg once daily for 2 weeks and then decrease to prednisone 40mg once daily until seen by GI  -outpatient follow up with Dr. Leon/GI

## 2018-07-31 NOTE — DISCHARGE NOTE ADULT - PATIENT PORTAL LINK FT
You can access the [email or web address] Patient Portal, offered by [facility], by registering with the following website: [email or web address]

## 2018-08-01 VITALS
HEART RATE: 87 BPM | DIASTOLIC BLOOD PRESSURE: 54 MMHG | SYSTOLIC BLOOD PRESSURE: 108 MMHG | TEMPERATURE: 97 F | RESPIRATION RATE: 16 BRPM

## 2018-08-01 DIAGNOSIS — K51.90 ULCERATIVE COLITIS, UNSPECIFIED, WITHOUT COMPLICATIONS: ICD-10-CM

## 2018-08-01 LAB
ANION GAP SERPL CALC-SCNC: 8 MMOL/L — SIGNIFICANT CHANGE UP (ref 7–14)
BUN SERPL-MCNC: 5 MG/DL — LOW (ref 10–20)
CALCIUM SERPL-MCNC: 7.9 MG/DL — LOW (ref 8.5–10.1)
CHLORIDE SERPL-SCNC: 108 MMOL/L — SIGNIFICANT CHANGE UP (ref 98–110)
CO2 SERPL-SCNC: 24 MMOL/L — SIGNIFICANT CHANGE UP (ref 17–32)
CREAT SERPL-MCNC: 0.8 MG/DL — SIGNIFICANT CHANGE UP (ref 0.7–1.5)
CULTURE RESULTS: SIGNIFICANT CHANGE UP
GLUCOSE SERPL-MCNC: 136 MG/DL — HIGH (ref 70–99)
HCT VFR BLD CALC: 31.8 % — LOW (ref 42–52)
HGB BLD-MCNC: 10.9 G/DL — LOW (ref 14–18)
MAGNESIUM SERPL-MCNC: 2.1 MG/DL — SIGNIFICANT CHANGE UP (ref 1.8–2.4)
MCHC RBC-ENTMCNC: 28 PG — SIGNIFICANT CHANGE UP (ref 27–31)
MCHC RBC-ENTMCNC: 34.3 G/DL — SIGNIFICANT CHANGE UP (ref 32–37)
MCV RBC AUTO: 81.7 FL — SIGNIFICANT CHANGE UP (ref 80–94)
NRBC # BLD: 0 /100 WBCS — SIGNIFICANT CHANGE UP (ref 0–0)
PLATELET # BLD AUTO: 306 K/UL — SIGNIFICANT CHANGE UP (ref 130–400)
POTASSIUM SERPL-MCNC: 4.3 MMOL/L — SIGNIFICANT CHANGE UP (ref 3.5–5)
POTASSIUM SERPL-SCNC: 4.3 MMOL/L — SIGNIFICANT CHANGE UP (ref 3.5–5)
RBC # BLD: 3.89 M/UL — LOW (ref 4.7–6.1)
RBC # FLD: 14.4 % — SIGNIFICANT CHANGE UP (ref 11.5–14.5)
SODIUM SERPL-SCNC: 140 MMOL/L — SIGNIFICANT CHANGE UP (ref 135–146)
SPECIMEN SOURCE: SIGNIFICANT CHANGE UP
WBC # BLD: 7.3 K/UL — SIGNIFICANT CHANGE UP (ref 4.8–10.8)
WBC # FLD AUTO: 7.3 K/UL — SIGNIFICANT CHANGE UP (ref 4.8–10.8)

## 2018-08-01 RX ORDER — MESALAMINE 400 MG
2 TABLET, DELAYED RELEASE (ENTERIC COATED) ORAL
Qty: 180 | Refills: 0 | OUTPATIENT
Start: 2018-08-01 | End: 2018-08-30

## 2018-08-01 RX ORDER — MESALAMINE 400 MG
4 TABLET, DELAYED RELEASE (ENTERIC COATED) ORAL
Qty: 56 | Refills: 0 | OUTPATIENT
Start: 2018-08-01

## 2018-08-01 RX ADMIN — SODIUM CHLORIDE 125 MILLILITER(S): 9 INJECTION INTRAMUSCULAR; INTRAVENOUS; SUBCUTANEOUS at 04:16

## 2018-08-01 RX ADMIN — Medication 100 MILLIGRAM(S): at 05:12

## 2018-08-01 RX ADMIN — PANTOPRAZOLE SODIUM 40 MILLIGRAM(S): 20 TABLET, DELAYED RELEASE ORAL at 05:13

## 2018-08-01 RX ADMIN — Medication 1600 MILLIGRAM(S): at 14:47

## 2018-08-01 RX ADMIN — Medication 20 MILLIGRAM(S): at 05:11

## 2018-08-01 RX ADMIN — Medication 500 MILLIGRAM(S): at 05:11

## 2018-08-01 RX ADMIN — SODIUM CHLORIDE 3 MILLILITER(S): 9 INJECTION INTRAMUSCULAR; INTRAVENOUS; SUBCUTANEOUS at 05:20

## 2018-08-01 RX ADMIN — Medication 1600 MILLIGRAM(S): at 05:11

## 2018-08-01 RX ADMIN — Medication 100 MILLIGRAM(S): at 14:48

## 2018-08-01 RX ADMIN — Medication 20 MILLIGRAM(S): at 14:47

## 2018-08-01 NOTE — PROGRESS NOTE ADULT - ASSESSMENT
43 year old male patient with protracted bloody diarrhea, s/p antibiotics with no response, yesterday had a flex sig to show colitis suggestive of  ulcerative colitis, awaiting biopsy results to confirm, currently improving on steroids and mesalamine.  Patient expressed his wish to be discharged today.  He will be given prednisone 60 mg daily for 2 weeks, then 40 mg thereafter.   F/Up with me as outpt in 3-4 weeks, or earlier if not better completely.  Delzicol 800 mg, 2 tab tid.  Regular diet.  No need for ATB.  risks and benefits, long term management of UC and compliance to medication were thoroughly discussed with him.
Patient is a 43y old  Male who presents with a chief complaint of Pancolitis (29 Jul 2018 06:17)                                                                                                                                                                                                                                                                                       HEALTH ISSUES - PROBLEM Dx:   pancolitis

## 2018-08-01 NOTE — PROGRESS NOTE ADULT - SUBJECTIVE AND OBJECTIVE BOX
43yMale being followed for ulcerative colitis    Interval Complaints: No blood in stools, consistency better. No abdominal pain or fever.    MEDICATIONS  (STANDING):  cefuroxime   Tablet 500 milliGRAM(s) Oral every 12 hours  mesalamine DR Capsule 1600 milliGRAM(s) Oral three times a day  methylPREDNISolone sodium succinate Injectable 20 milliGRAM(s) IV Push every 8 hours  metroNIDAZOLE  IVPB 500 milliGRAM(s) IV Intermittent every 8 hours  pantoprazole    Tablet 40 milliGRAM(s) Oral before breakfast  sodium chloride 0.9% lock flush 3 milliLiter(s) IV Push every 8 hours  sodium chloride 0.9%. 1000 milliLiter(s) (125 mL/Hr) IV Continuous <Continuous>    MEDICATIONS  (PRN):  acetaminophen   Tablet 650 milliGRAM(s) Oral every 6 hours PRN For Temp greater than 38 C (100.4 F)  acetaminophen   Tablet. 650 milliGRAM(s) Oral every 6 hours PRN Mild Pain (1 - 3)  morphine  - Injectable 4 milliGRAM(s) IV Push every 4 hours PRN Severe Pain (7 - 10)  ondansetron Injectable 4 milliGRAM(s) IV Push every 4 hours PRN Nausea and/or Vomiting  simethicone 80 milliGRAM(s) Chew three times a day PRN Gas    REVIEW OF SYSTEMS:  General:  No weight loss, fevers, chills, night sweats, fatigue,   Eyes:  Good vision, no reported pain or visual changes  ENT:  No sore throat, pain, runny nose, dysphagia  NECK: No pain or stiffness, no lymphadenopathy  CV:  No chest pain, palpitations, or hypo/hypertension  Resp:  No dyspnea, shortness of breath, cough, tachypnea, wheezing or hemoptysis  GI:  No abdominal or epigastric pain, nausea, vomiting, dysphagia, minimal diarrhea this am x 1. No rectal bleeding, melenic stools, or hematemesis.  :  No vaginal bleeding, dysuria, frequency, urinary incontinence, hematuria or nocturia  Muscle:  No pain, weakness  Neuro:  No weakness, tingling, numbness, memory problems, or vertigo  Psych:  No fatigue, insomnia, mood problems, depression  Endocrine:  No polyuria, polydipsia, cold/heat intolerance  Heme:  No petechiae, ecchymosis, easy bruisability  Skin:  No rash, tattoos, scars, edema or pruritis  All other review of systems is negative unless indicated above.          VITAL SIGNS:   T(F): 97.3 (08-01-18 @ 14:46), Max: 100 (07-31-18 @ 21:33)  HR: 87 (08-01-18 @ 14:46) (83 - 93)  BP: 108/54 (08-01-18 @ 14:46) (97/55 - 118/56)  RR: 16 (08-01-18 @ 14:46) (16 - 16)  SpO2: --  PHYSICAL EXAM:  GENERAL: AAOx3, no acute distress, well-developed, well nourished  HEAD:  Atraumatic, Normocephalic  EYES: EOMI, PERRLA, conjunctiva and sclera clear  NECK: Supple, No JVD, no cervical lymphadenopathy, no thyromegaly  CHEST/LUNG: Clear to auscultation bilaterally; No wheeze, rhonchi, or rales  HEART: Regular rate and rhythm; normal S1, S2, No murmurs, rubs, or gallops  ABDOMEN: Soft, nontender, nondistended; Bowel sounds present, no abdominal bruit, masses, or hepatomegaly  EXTREMITIES:  2+ Peripheral Pulses. No clubbing, cyanosis, or edema  PSYCH:  Cooperative and appropriate  NEUROLOGY: No asterixis or tremor. CN II-XII intact. Strength 5/5 throughout. Sensation intact. Appropriate cerebellar functions.   SKIN: No rashes, skin lesions, erythema, ecchymoses, petechiae or wounds  Rectal exam: no blood in stools or rectal lesions.    LABS:                        10.9   7.30  )-----------( 306      ( 01 Aug 2018 06:23 )             31.8     08-01    140  |  108  |  5<L>  ----------------------------<  136<H>  4.3   |  24  |  0.8    Ca    7.9<L>      01 Aug 2018 06:23  Mg     2.1     08-01
ANN MARIE GOLDSTEIN  43y  Male      Patient is a 43y old  Male who presents with a chief complaint of Pancolitis (29 Jul 2018 06:17)      INTERVAL HPI/OVERNIGHT EVENTS:    patient admitted for pancolitis, with bloody bowel movement.   cipro stopped due to side effects (not feeling himself/sweating), no rash or any resp symptoms; ordered cephalosporins and will continue flagyl  GI Dr. León following the case  -patient is s/p sigmoidoscopy yesterday (likely Ulcerative colitis, biopsies to be followed up)  -spoke to his wife in length at bedside; pt wants to be discharged, await GI follow up for clearance    Vital Signs Last 24 Hrs  T(C): 36.5 (31 Jul 2018 06:04), Max: 37.3 (30 Jul 2018 22:12)  T(F): 97.7 (31 Jul 2018 06:04), Max: 99.2 (30 Jul 2018 22:12)  HR: 93 (31 Jul 2018 06:04) (93 - 94)  BP: 101/59 (31 Jul 2018 06:04) (101/59 - 111/68)  RR: 16 (31 Jul 2018 06:04) (16 - 16)    PHYSICAL EXAM:  GENERAL: NAD, well-groomed, well-developed  HEAD:  Atraumatic, Normocephalic  EYES: EOMI, PERRLA, conjunctiva and sclera clear  NERVOUS SYSTEM:  Alert & Oriented X 4, Good concentration; Motor Strength 5/5 B/L upper and lower extremities; DTRs 2+ intact and symmetric  CHEST/LUNG: Clear to percussion bilaterally; No rales, rhonchi, wheezing, or rubs  HEART: Regular rate and rhythm; No murmurs, rubs, or gallops  ABDOMEN: Soft, no guarding, no rebound tenderness  EXTREMITIES:  2+ Peripheral Pulses, No clubbing, cyanosis, or edema  SKIN: No rashes or lesions    LAB:                            10.9   7.30  )-----------( 306      ( 01 Aug 2018 06:23 )             31.8   08-01    140  |  108  |  5<L>  ----------------------------<  136<H>  4.3   |  24  |  0.8    Ca    7.9<L>      01 Aug 2018 06:23  Mg     2.1     08-01      Xray Chest 2 Views PA/Lat (07.29.18 @ 04:13)   Impression:      No radiographic evidence of acute cardiopulmonary disease.      U/A:  Color: Dixie / Appearance: Clear / SG: >=1.030 / pH: x  Gluc: x / Ketone: Trace  / Bili: Small / Urobili: 0.2 mg/dL   Blood: x / Protein: 30 mg/dL / Nitrite: Positive   Leuk Esterase: Trace / RBC: 1-2 /HPF / WBC 3-5 /HPF   Sq Epi: x / Non Sq Epi: Occasional /HPF / Bacteria: Moderate      LIVER FUNCTIONS - ( 29 Jul 2018 02:35 )  Alb: 3.0 g/dL / Pro: 5.2 g/dL / ALK PHOS: 42 U/L / ALT: 8 U/L / AST: 18 U/L / GGT: x           RADIOLOGY:    Imaging Personally Reviewed:  [y ] YES  [ ] NO      HEALTH ISSUES - PROBLEM Dx:  Pancolitis: Pancolitis    MEDICATIONS  (STANDING):  cefuroxime   Tablet 500 milliGRAM(s) Oral every 12 hours  mesalamine DR Capsule 1600 milliGRAM(s) Oral three times a day  methylPREDNISolone sodium succinate Injectable 20 milliGRAM(s) IV Push every 8 hours  metroNIDAZOLE  IVPB 500 milliGRAM(s) IV Intermittent every 8 hours  pantoprazole    Tablet 40 milliGRAM(s) Oral before breakfast  sodium chloride 0.9% lock flush 3 milliLiter(s) IV Push every 8 hours  sodium chloride 0.9%. 1000 milliLiter(s) (125 mL/Hr) IV Continuous <Continuous>    MEDICATIONS  (PRN):  acetaminophen   Tablet 650 milliGRAM(s) Oral every 6 hours PRN For Temp greater than 38 C (100.4 F)  acetaminophen   Tablet. 650 milliGRAM(s) Oral every 6 hours PRN Mild Pain (1 - 3)  morphine  - Injectable 4 milliGRAM(s) IV Push every 4 hours PRN Severe Pain (7 - 10)  ondansetron Injectable 4 milliGRAM(s) IV Push every 4 hours PRN Nausea and/or Vomiting  simethicone 80 milliGRAM(s) Chew three times a day PRN Gas
ANN MARIE GOLDSTEIN  43y  Male      Patient is a 43y old  Male who presents with a chief complaint of Pancolitis (29 Jul 2018 06:17)      INTERVAL HPI/OVERNIGHT EVENTS:  patient admitted for colitis, bloody bowel movement 6am, will continue inpatient monitoring for 24 hours and follow up labs, advance diet as tolerated.     Vital Signs Last 24 Hrs  T(C): 36.5 (30 Jul 2018 05:23), Max: 36.7 (29 Jul 2018 22:00)  T(F): 97.7 (30 Jul 2018 05:23), Max: 98.1 (29 Jul 2018 22:00)  HR: 89 (30 Jul 2018 05:23) (89 - 97)  BP: 112/65 (30 Jul 2018 05:23) (102/65 - 112/65)  RR: 18 (30 Jul 2018 05:23) (16 - 18)  SpO2: --    PHYSICAL EXAM:  GENERAL: NAD, well-groomed, well-developed  HEAD:  Atraumatic, Normocephalic  EYES: EOMI, PERRLA, conjunctiva and sclera clear  NERVOUS SYSTEM:  Alert & Oriented X 4, Good concentration; Motor Strength 5/5 B/L upper and lower extremities; DTRs 2+ intact and symmetric  CHEST/LUNG: Clear to percussion bilaterally; No rales, rhonchi, wheezing, or rubs  HEART: Regular rate and rhythm; No murmurs, rubs, or gallops  ABDOMEN: Soft, no guarding, no rebound tenderness, generalized tenderness on deep palpation.   EXTREMITIES:  2+ Peripheral Pulses, No clubbing, cyanosis, or edema  SKIN: No rashes or lesions    LAB:                        11.3   7.84  )-----------( 255      ( 30 Jul 2018 07:29 )             33.0     07-30    137  |  104  |  5<L>  ----------------------------<  104<H>  4.0   |  23  |  0.8    Ca    7.5<L>      30 Jul 2018 07:29  Mg     2.1     07-29    TPro  5.2<L>  /  Alb  3.0<L>  /  TBili  0.5  /  DBili  x   /  AST  18  /  ALT  8   /  AlkPhos  42  07-29        Daily     Daily   CAPILLARY BLOOD GLUCOSE    Xray Chest 2 Views PA/Lat (07.29.18 @ 04:13)   Impression:      No radiographic evidence of acute cardiopulmonary disease.      U/A:  Color: Dixie / Appearance: Clear / SG: >=1.030 / pH: x  Gluc: x / Ketone: Trace  / Bili: Small / Urobili: 0.2 mg/dL   Blood: x / Protein: 30 mg/dL / Nitrite: Positive   Leuk Esterase: Trace / RBC: 1-2 /HPF / WBC 3-5 /HPF   Sq Epi: x / Non Sq Epi: Occasional /HPF / Bacteria: Moderate      LIVER FUNCTIONS - ( 29 Jul 2018 02:35 )  Alb: 3.0 g/dL / Pro: 5.2 g/dL / ALK PHOS: 42 U/L / ALT: 8 U/L / AST: 18 U/L / GGT: x           RADIOLOGY:    Imaging Personally Reviewed:  [y ] YES  [ ] NO      HEALTH ISSUES - PROBLEM Dx:  Pancolitis: Pancolitis      MEDICATIONS  (STANDING):  ciprofloxacin   IVPB 400 milliGRAM(s) IV Intermittent every 12 hours  metroNIDAZOLE  IVPB 500 milliGRAM(s) IV Intermittent every 8 hours  pantoprazole  Injectable 40 milliGRAM(s) IV Push daily  sodium chloride 0.9% lock flush 3 milliLiter(s) IV Push every 8 hours  sodium chloride 0.9%. 1000 milliLiter(s) (125 mL/Hr) IV Continuous <Continuous>    MEDICATIONS  (PRN):  morphine  - Injectable 4 milliGRAM(s) IV Push every 4 hours PRN Severe Pain (7 - 10)  ondansetron Injectable 4 milliGRAM(s) IV Push every 4 hours PRN Nausea and/or Vomiting
ANN MARIE GOLDSTEIN  43y  Male      Patient is a 43y old  Male who presents with a chief complaint of Pancolitis (29 Jul 2018 06:17)      INTERVAL HPI/OVERNIGHT EVENTS:    patient admitted for pancolitis, with bloody bowel movement.   cipro stopped due to side effects (not feeling himself/sweating), no rash or any resp symptoms; ordered cephalosporins and will continue flagyl  TOMY León following the case, and patient is scheduled for sigmoidoscopy today at 4pm  No discharge until cleared by GI. d/c papers done and escripts sent to his pharmacy    Vital Signs Last 24 Hrs  T(C): 36.5 (31 Jul 2018 06:04), Max: 37.3 (30 Jul 2018 22:12)  T(F): 97.7 (31 Jul 2018 06:04), Max: 99.2 (30 Jul 2018 22:12)  HR: 93 (31 Jul 2018 06:04) (93 - 94)  BP: 101/59 (31 Jul 2018 06:04) (101/59 - 111/68)  RR: 16 (31 Jul 2018 06:04) (16 - 16)    PHYSICAL EXAM:  GENERAL: NAD, well-groomed, well-developed  HEAD:  Atraumatic, Normocephalic  EYES: EOMI, PERRLA, conjunctiva and sclera clear  NERVOUS SYSTEM:  Alert & Oriented X 4, Good concentration; Motor Strength 5/5 B/L upper and lower extremities; DTRs 2+ intact and symmetric  CHEST/LUNG: Clear to percussion bilaterally; No rales, rhonchi, wheezing, or rubs  HEART: Regular rate and rhythm; No murmurs, rubs, or gallops  ABDOMEN: Soft, no guarding, no rebound tenderness, generalized tenderness on deep palpation.   EXTREMITIES:  2+ Peripheral Pulses, No clubbing, cyanosis, or edema  SKIN: No rashes or lesions    LAB:                        11.3   7.84  )-----------( 255      ( 30 Jul 2018 07:29 )             33.0     07-30    137  |  104  |  5<L>  ----------------------------<  104<H>  4.0   |  23  |  0.8    Ca    7.5<L>      30 Jul 2018 07:29  Mg     2.1     07-29    TPro  5.2<L>  /  Alb  3.0<L>  /  TBili  0.5  /  DBili  x   /  AST  18  /  ALT  8   /  AlkPhos  42  07-29        Daily     Daily   CAPILLARY BLOOD GLUCOSE    Xray Chest 2 Views PA/Lat (07.29.18 @ 04:13)   Impression:      No radiographic evidence of acute cardiopulmonary disease.      U/A:  Color: Dixie / Appearance: Clear / SG: >=1.030 / pH: x  Gluc: x / Ketone: Trace  / Bili: Small / Urobili: 0.2 mg/dL   Blood: x / Protein: 30 mg/dL / Nitrite: Positive   Leuk Esterase: Trace / RBC: 1-2 /HPF / WBC 3-5 /HPF   Sq Epi: x / Non Sq Epi: Occasional /HPF / Bacteria: Moderate      LIVER FUNCTIONS - ( 29 Jul 2018 02:35 )  Alb: 3.0 g/dL / Pro: 5.2 g/dL / ALK PHOS: 42 U/L / ALT: 8 U/L / AST: 18 U/L / GGT: x           RADIOLOGY:    Imaging Personally Reviewed:  [y ] YES  [ ] NO      HEALTH ISSUES - PROBLEM Dx:  Pancolitis: Pancolitis      MEDICATIONS  (STANDING):  ciprofloxacin   IVPB 400 milliGRAM(s) IV Intermittent every 12 hours  metroNIDAZOLE  IVPB 500 milliGRAM(s) IV Intermittent every 8 hours  pantoprazole  Injectable 40 milliGRAM(s) IV Push daily  sodium chloride 0.9% lock flush 3 milliLiter(s) IV Push every 8 hours  sodium chloride 0.9%. 1000 milliLiter(s) (125 mL/Hr) IV Continuous <Continuous>    MEDICATIONS  (PRN):  morphine  - Injectable 4 milliGRAM(s) IV Push every 4 hours PRN Severe Pain (7 - 10)  ondansetron Injectable 4 milliGRAM(s) IV Push every 4 hours PRN Nausea and/or Vomiting

## 2018-08-01 NOTE — PROGRESS NOTE ADULT - PROBLEM SELECTOR PLAN 1
-on IV antibiotics  -advance diet  -GI following
-started on IV steroids, and mesalamine  -GI follow up today for d/c planning
-on cephalosporins and flagyl  -advanced diet and tolerated  -GI following; sigmoidoscopy today at 4pm

## 2018-08-01 NOTE — PROGRESS NOTE ADULT - PROBLEM SELECTOR PLAN 2
-type and screen  -no blood transfusion
-monitor H/H  -bloody bowel movement (no need for blood transfusion)
-on cephalosporins and flagyl  -advanced diet and patient is tolerating

## 2018-08-01 NOTE — PROGRESS NOTE ADULT - NSHPATTENDINGPLANDISCUSS_GEN_ALL_CORE
medical staff and wife at bedside
pt and care team
medical staff and family at bedside
medical staff and wife at bedside

## 2018-08-03 LAB — E HISTOLYT AG STL IA-ACNC: SIGNIFICANT CHANGE UP

## 2018-08-04 LAB — SURGICAL PATHOLOGY STUDY: SIGNIFICANT CHANGE UP

## 2018-08-05 DIAGNOSIS — K51.00 ULCERATIVE (CHRONIC) PANCOLITIS WITHOUT COMPLICATIONS: ICD-10-CM

## 2018-08-05 DIAGNOSIS — D64.9 ANEMIA, UNSPECIFIED: ICD-10-CM

## 2018-08-05 DIAGNOSIS — R10.9 UNSPECIFIED ABDOMINAL PAIN: ICD-10-CM

## 2018-09-07 PROBLEM — K51.00 ULCERATIVE (CHRONIC) PANCOLITIS WITHOUT COMPLICATIONS: Chronic | Status: ACTIVE | Noted: 2018-07-29

## 2018-09-10 PROBLEM — Z00.00 ENCOUNTER FOR PREVENTIVE HEALTH EXAMINATION: Status: ACTIVE | Noted: 2018-09-10

## 2018-10-05 ENCOUNTER — APPOINTMENT (OUTPATIENT)
Dept: NEUROSURGERY | Facility: CLINIC | Age: 43
End: 2018-10-05
Payer: OTHER MISCELLANEOUS

## 2018-10-05 VITALS
HEIGHT: 72 IN | RESPIRATION RATE: 18 BRPM | SYSTOLIC BLOOD PRESSURE: 143 MMHG | WEIGHT: 250 LBS | HEART RATE: 89 BPM | DIASTOLIC BLOOD PRESSURE: 86 MMHG | OXYGEN SATURATION: 98 % | BODY MASS INDEX: 33.86 KG/M2

## 2018-10-05 DIAGNOSIS — R20.0 ANESTHESIA OF SKIN: ICD-10-CM

## 2018-10-05 DIAGNOSIS — Z78.9 OTHER SPECIFIED HEALTH STATUS: ICD-10-CM

## 2018-10-05 DIAGNOSIS — M54.2 CERVICALGIA: ICD-10-CM

## 2018-10-05 DIAGNOSIS — M79.2 NEURALGIA AND NEURITIS, UNSPECIFIED: ICD-10-CM

## 2018-10-05 DIAGNOSIS — M54.5 LOW BACK PAIN: ICD-10-CM

## 2018-10-05 DIAGNOSIS — R29.898 OTHER SYMPTOMS AND SIGNS INVOLVING THE MUSCULOSKELETAL SYSTEM: ICD-10-CM

## 2018-10-05 DIAGNOSIS — Z87.19 PERSONAL HISTORY OF OTHER DISEASES OF THE DIGESTIVE SYSTEM: ICD-10-CM

## 2018-10-05 DIAGNOSIS — R20.2 ANESTHESIA OF SKIN: ICD-10-CM

## 2018-10-05 DIAGNOSIS — M54.16 RADICULOPATHY, LUMBAR REGION: ICD-10-CM

## 2018-10-05 PROCEDURE — 99204 OFFICE O/P NEW MOD 45 MIN: CPT

## 2018-10-07 PROBLEM — M54.16 RIGHT LUMBAR RADICULOPATHY: Status: ACTIVE | Noted: 2018-10-07

## 2018-10-07 PROBLEM — R29.898 RIGHT HAND WEAKNESS: Status: ACTIVE | Noted: 2018-10-07

## 2018-10-07 PROBLEM — M79.2 RADICULAR PAIN IN RIGHT ARM: Status: ACTIVE | Noted: 2018-10-07

## 2018-10-07 PROBLEM — Z78.9 SOCIAL ALCOHOL USE: Status: ACTIVE | Noted: 2018-10-07

## 2018-10-07 PROBLEM — Z78.9 NON-SMOKER: Status: ACTIVE | Noted: 2018-10-07

## 2018-10-07 PROBLEM — M54.2 NECK PAIN: Status: ACTIVE | Noted: 2018-10-07

## 2018-10-07 PROBLEM — M54.5 LOW BACK PAIN RADIATING TO RIGHT LEG: Status: ACTIVE | Noted: 2018-10-07

## 2018-10-07 PROBLEM — Z87.19 HISTORY OF ULCERATIVE COLITIS: Status: RESOLVED | Noted: 2018-10-07 | Resolved: 2018-10-07

## 2018-10-07 PROBLEM — R20.0 NUMBNESS AND TINGLING OF RIGHT LEG: Status: ACTIVE | Noted: 2018-10-07

## 2018-10-07 RX ORDER — ESCITALOPRAM OXALATE 5 MG/1
TABLET, FILM COATED ORAL
Refills: 0 | Status: ACTIVE | COMMUNITY

## 2018-10-07 RX ORDER — CYCLOBENZAPRINE HCL 5 MG
TABLET ORAL
Refills: 0 | Status: ACTIVE | COMMUNITY

## 2018-10-07 RX ORDER — CELECOXIB 50 MG/1
CAPSULE ORAL
Refills: 0 | Status: ACTIVE | COMMUNITY

## 2018-10-07 RX ORDER — LORAZEPAM 2 MG/1
TABLET ORAL
Refills: 0 | Status: ACTIVE | COMMUNITY
